# Patient Record
Sex: FEMALE | Race: BLACK OR AFRICAN AMERICAN
[De-identification: names, ages, dates, MRNs, and addresses within clinical notes are randomized per-mention and may not be internally consistent; named-entity substitution may affect disease eponyms.]

---
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
## 2019-02-27 ENCOUNTER — HOSPITAL ENCOUNTER (OUTPATIENT)
Dept: HOSPITAL 80 - FIMAGING | Age: 19
End: 2019-02-27
Attending: PHYSICIAN ASSISTANT
Payer: COMMERCIAL

## 2019-02-27 DIAGNOSIS — D68.69: ICD-10-CM

## 2019-02-27 DIAGNOSIS — M79.622: Primary | ICD-10-CM
